# Patient Record
Sex: FEMALE | Race: BLACK OR AFRICAN AMERICAN | Employment: FULL TIME | URBAN - METROPOLITAN AREA
[De-identification: names, ages, dates, MRNs, and addresses within clinical notes are randomized per-mention and may not be internally consistent; named-entity substitution may affect disease eponyms.]

---
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
## 2018-01-19 PROBLEM — O36.5920 POOR FETAL GROWTH AFFECTING MANAGEMENT OF MOTHER IN SECOND TRIMESTER: Status: ACTIVE | Noted: 2017-12-13

## 2018-02-16 PROBLEM — O99.013: Status: ACTIVE | Noted: 2017-12-13

## 2018-02-16 PROBLEM — O26.893 HEADACHE IN PREGNANCY, THIRD TRIMESTER: Status: RESOLVED | Noted: 2017-12-13 | Resolved: 2018-02-16

## 2018-02-16 PROBLEM — R51.9 HEADACHE IN PREGNANCY, THIRD TRIMESTER: Status: RESOLVED | Noted: 2017-12-13 | Resolved: 2018-02-16

## 2018-02-16 PROBLEM — O36.5930 POOR FETAL GROWTH AFFECTING MANAGEMENT OF MOTHER IN THIRD TRIMESTER: Status: ACTIVE | Noted: 2017-12-13

## 2018-02-16 PROBLEM — O26.893 HEADACHE IN PREGNANCY, THIRD TRIMESTER: Status: ACTIVE | Noted: 2017-12-13

## 2018-03-16 PROBLEM — O09.93 HIGH-RISK PREGNANCY IN THIRD TRIMESTER: Status: ACTIVE | Noted: 2018-03-16

## 2020-02-20 PROBLEM — D57.3 SICKLE CELL TRAIT IN MOTHER AFFECTING PREGNANCY (HCC): Status: ACTIVE | Noted: 2017-12-13

## 2020-02-20 PROBLEM — O09.93 HIGH-RISK PREGNANCY IN THIRD TRIMESTER: Status: RESOLVED | Noted: 2018-03-16 | Resolved: 2020-02-20

## 2020-02-20 PROBLEM — O36.5930 POOR FETAL GROWTH AFFECTING MANAGEMENT OF MOTHER IN THIRD TRIMESTER: Status: RESOLVED | Noted: 2017-12-13 | Resolved: 2020-02-20

## 2020-02-20 PROBLEM — M41.9 SCOLIOSIS: Status: RESOLVED | Noted: 2017-12-13 | Resolved: 2020-02-20

## 2020-02-20 PROBLEM — O09.299 IUGR (INTRAUTERINE GROWTH RESTRICTION) IN PRIOR PREGNANCY, PREGNANT: Status: ACTIVE | Noted: 2020-02-20

## 2022-03-19 PROBLEM — O09.299 IUGR (INTRAUTERINE GROWTH RESTRICTION) IN PRIOR PREGNANCY, PREGNANT: Status: ACTIVE | Noted: 2020-02-20

## 2022-03-19 PROBLEM — O35.9XX0 SUSPECTED FETAL ANOMALY, ANTEPARTUM: Status: ACTIVE | Noted: 2017-12-13

## 2022-03-20 PROBLEM — O99.019 SICKLE CELL TRAIT IN MOTHER AFFECTING PREGNANCY (HCC): Status: ACTIVE | Noted: 2017-12-13

## 2022-03-20 PROBLEM — D57.3 SICKLE CELL TRAIT IN MOTHER AFFECTING PREGNANCY (HCC): Status: ACTIVE | Noted: 2017-12-13

## 2023-01-20 ENCOUNTER — OFFICE VISIT (OUTPATIENT)
Dept: OCCUPATIONAL MEDICINE | Age: 23
End: 2023-01-20

## 2023-01-20 VITALS — HEART RATE: 81 BPM | OXYGEN SATURATION: 97 % | DIASTOLIC BLOOD PRESSURE: 90 MMHG | SYSTOLIC BLOOD PRESSURE: 122 MMHG

## 2023-01-20 DIAGNOSIS — H57.12 LEFT EYE PAIN: Primary | ICD-10-CM

## 2023-01-20 DIAGNOSIS — H53.8 BLURRED VISION, LEFT EYE: ICD-10-CM

## 2023-01-20 RX ORDER — BUTALBITAL, ACETAMINOPHEN AND CAFFEINE 50; 325; 40 MG/1; MG/1; MG/1
1-2 TABLET ORAL EVERY 6 HOURS PRN
COMMUNITY
Start: 2022-03-08 | End: 2023-03-08

## 2023-01-20 ASSESSMENT — ENCOUNTER SYMPTOMS
EYE REDNESS: 1
EYE PAIN: 1
PHOTOPHOBIA: 1
EYE DISCHARGE: 0

## 2023-01-20 NOTE — PROGRESS NOTES
PROGRESS NOTE    SUBJECTIVE:   Michaela Gomez is a 23 y.o. female seen for left eye irritation. Chief Complaint    Eye Problem         Ms. Linda Orellana is a 24 y/o female with PMH migraines who relays she started having left eye erythema and irritation yesterday. She works in a [address] setting, but she denies feeling anything hit her left eye. She endorses tearing and eye pain, specifically when she is in sunlight. She also endorses an intermittent pressure that she feels \"all around her eye\". Endorses intermittent blurred vision and a frontal headache that started yesterday. She does not wear contacts or glasses. She relays it does not feel like a typical migraine. She has not taken any medication or tried any drops. Denies fevers, eye drainage/discharge. Current Outpatient Medications   Medication Sig Dispense Refill    butalbital-acetaminophen-caffeine (FIORICET, ESGIC) -40 MG per tablet Take 1-2 tablets by mouth every 6 hours as needed       No current facility-administered medications for this visit. No Known Allergies    Social History     Tobacco Use    Smoking status: Never    Smokeless tobacco: Never   Substance Use Topics    Alcohol use: Not Currently    Drug use: Not Currently        Review of Systems   Constitutional:  Negative for fever. Eyes:  Positive for photophobia, pain, redness and visual disturbance. Negative for discharge. Neurological:  Positive for headaches. Frontal headache yesterday. OBJECTIVE:  BP (!) 122/90 (Site: Left Upper Arm)   Pulse 81   SpO2 97%      No results found for this visit on 01/20/23. Physical Exam  Constitutional:       Appearance: Normal appearance. Eyes:      General:         Left eye: No foreign body or discharge. Conjunctiva/sclera:      Left eye: Left conjunctiva is injected. Pupils: Pupils are equal, round, and reactive to light. Comments: No foreign body or scratch seen on eye surface with handheld light.  Unable to utilize Derek's in clinic to assess for corneal abrasion. The left sclera and conjunctiva are injected. No discharge noted. L eye 20/40  R eye 20/25  Both: 20/25    I used several drops of sterile eyewash- patient denied feeling anything move/scratch. Denied pain with drops. Did not see improvement. Cardiovascular:      Rate and Rhythm: Normal rate and regular rhythm. Pulmonary:      Effort: Pulmonary effort is normal.   Neurological:      Mental Status: She is alert. ASSESSMENT and PLAN    Fernanda Baum was seen today for eye problem. Diagnoses and all orders for this visit:    Left eye pain  Comments:  Ophthalmology appt at 2:10 today with [facility] in [facility]. Blurred vision, left eye  Comments:  Ophthalmology appt at 2:10 today with [facility] in [facility]. Unable to see any visible scratches on eye- referring to Ophthalmology today at 2:10 pm with Dr. Dakotah Fletcher ([facility]). Patient was given work excuse for today, provided office address and phone number for her appt. She verbalized understanding. Counseled on benefits of having a primary care provider which includes, but is not limited to, continuity of care and having a medical home when concerns arise. Also enforced that onsite clinic policy states that we are not to take the place of a primary care provider, pt verbalized understanding. SEs and risk vs benefits associated with medications prescribed discussed with patient who verbalized understanding. Pt verbalized understanding and agreement with plan of care. RTC for persisting/worsening symptoms or new complaints that arise. Discussed signs and symptoms that would warrant immediate evaluation including, but not limited to HA, blurred vision, speech disturbance, difficulty with ambulation/gait, numbness, tingling, weakness, syncope, chest pain, or shortness of breath.     I have reviewed the patient's medication list, past medical, family, social, and surgical history in detail and updated the patient record appropriately.     Ayden Saenz, APRN - CNP

## 2023-01-23 ENCOUNTER — TELEPHONE (OUTPATIENT)
Dept: OCCUPATIONAL MEDICINE | Age: 23
End: 2023-01-23

## 2023-01-23 NOTE — TELEPHONE ENCOUNTER
Called patient to check in- she stated she had to reschedule her eye appt because she would not make the time scheduled. She has an eye appt on 1/26 of this week. She relays her eye is feeling better- the redness has decreased but she is still feeling pressure. She also endorses frontal headaches. She was informed to go to Urgent Care or the ER if she starts experiencing any of the following but not limited to: increased blurred vision, increased/painful eye pressure, N/V. She was also asked to RTC on 1/27 to discuss her eye appt. She verbalized understanding.

## 2023-02-10 ENCOUNTER — OFFICE VISIT (OUTPATIENT)
Dept: OCCUPATIONAL MEDICINE | Age: 23
End: 2023-02-10

## 2023-02-10 VITALS — HEART RATE: 100 BPM | DIASTOLIC BLOOD PRESSURE: 92 MMHG | OXYGEN SATURATION: 98 % | SYSTOLIC BLOOD PRESSURE: 124 MMHG

## 2023-02-10 DIAGNOSIS — R51.9 ACUTE NONINTRACTABLE HEADACHE, UNSPECIFIED HEADACHE TYPE: ICD-10-CM

## 2023-02-10 DIAGNOSIS — J30.9 ALLERGIC RHINITIS, UNSPECIFIED SEASONALITY, UNSPECIFIED TRIGGER: ICD-10-CM

## 2023-02-10 DIAGNOSIS — R52 BODY ACHES: ICD-10-CM

## 2023-02-10 DIAGNOSIS — H10.9 BACTERIAL CONJUNCTIVITIS OF RIGHT EYE: ICD-10-CM

## 2023-02-10 DIAGNOSIS — J06.9 VIRAL URI WITH COUGH: Primary | ICD-10-CM

## 2023-02-10 LAB
INFLUENZA A ANTIGEN, POC: NEGATIVE
INFLUENZA B ANTIGEN, POC: NEGATIVE
SARS-COV-2 RNA, POC: NEGATIVE

## 2023-02-10 RX ORDER — OFLOXACIN 3 MG/ML
2 SOLUTION/ DROPS OPHTHALMIC 4 TIMES DAILY
Qty: 5 ML | Refills: 0 | Status: SHIPPED | OUTPATIENT
Start: 2023-02-10 | End: 2023-02-17

## 2023-02-10 RX ORDER — LORATADINE 10 MG/1
10 TABLET ORAL DAILY
Qty: 30 TABLET | Refills: 1 | Status: SHIPPED | OUTPATIENT
Start: 2023-02-10

## 2023-02-10 ASSESSMENT — ENCOUNTER SYMPTOMS
WHEEZING: 0
ABDOMINAL PAIN: 0
NAUSEA: 0
EYE REDNESS: 1
DIARRHEA: 0
COUGH: 1
EYE DISCHARGE: 1
RHINORRHEA: 1
EYE PAIN: 1
SORE THROAT: 1
SHORTNESS OF BREATH: 0
VOMITING: 0
PHOTOPHOBIA: 0
CHEST TIGHTNESS: 0

## 2023-02-10 NOTE — PROGRESS NOTES
PROGRESS NOTE    SUBJECTIVE:   Bipin Paris is a 23 y.o. female seen for COVID/flu symptoms. Chief Complaint    Headache         Ms. Hortencia Ordonez is a 22 y/o female with PMH asthma who relays she has been experiencing frontal headaches, body aches, sore throat, cough, right eye pain with discharge x6-7 days. She is not aware of any sick contacts. She relays she woke up with \"my right eye crusted shut this morning\". She does endorse some mild pain with the right eye. Denies fever, SOB, difficulty breathing, chest pain, or visual differences. She does not use a rescue inhaler. She has been taking BC powders and Tylenol intermittently this past week. Headache    Current Outpatient Medications   Medication Sig Dispense Refill    ofloxacin (OCUFLOX) 0.3 % solution Place 2 drops into the right eye 4 times daily for 7 days 5 mL 0    loratadine (CLARITIN) 10 MG tablet Take 1 tablet by mouth daily 30 tablet 1    butalbital-acetaminophen-caffeine (FIORICET, ESGIC) -40 MG per tablet Take 1-2 tablets by mouth every 6 hours as needed       No current facility-administered medications for this visit. No Known Allergies    Social History     Tobacco Use    Smoking status: Never    Smokeless tobacco: Never   Substance Use Topics    Alcohol use: Not Currently    Drug use: Not Currently        Review of Systems   Constitutional:  Positive for fatigue. Negative for fever. HENT:  Positive for congestion, postnasal drip, rhinorrhea, sneezing and sore throat. Negative for ear pain. Eyes:  Positive for pain, discharge and redness. Negative for photophobia and visual disturbance. Right eye   Respiratory:  Positive for cough. Negative for chest tightness, shortness of breath and wheezing. Cardiovascular:  Negative for chest pain. Gastrointestinal:  Negative for abdominal pain, diarrhea, nausea and vomiting. Musculoskeletal:  Positive for myalgias. Neurological:  Positive for headaches. OBJECTIVE:  BP (!) 124/92 (Site: Left Upper Arm)   Pulse 100   SpO2 98%      Results for orders placed or performed in visit on 02/10/23   AMB POC SARS-COV-2 AND INFLUENZA A/B   Result Value Ref Range    SARS-COV-2 RNA, POC Negative     Influenza A Antigen, POC Negative Negative    Influenza B Antigen, POC Negative Negative       Physical Exam  Constitutional:       Appearance: She is ill-appearing. HENT:      Nose: Congestion present. Mouth/Throat:      Pharynx: Posterior oropharyngeal erythema present. Tonsils: No tonsillar exudate. 0 on the right. 0 on the left. Eyes:      Conjunctiva/sclera:      Right eye: Right conjunctiva is injected. Left eye: Left conjunctiva is not injected. Comments: Right lower lid edematous. No active discharge noted. Cardiovascular:      Rate and Rhythm: Normal rate and regular rhythm. Heart sounds: Normal heart sounds. Pulmonary:      Effort: Pulmonary effort is normal.      Breath sounds: Normal breath sounds. Neurological:      Mental Status: She is alert. ASSESSMENT and PLAN    Jagdish Jang was seen today for headache. Diagnoses and all orders for this visit:    Viral URI with cough  Comments:  COVID/flu negative. OTC measures given. Bacterial conjunctivitis of right eye  Comments:  Ofloxacin gtts. Work note today. Change bed/pillow linen. Wash hands if touching eye. Orders:  -     ofloxacin (OCUFLOX) 0.3 % solution; Place 2 drops into the right eye 4 times daily for 7 days    Allergic rhinitis, unspecified seasonality, unspecified trigger  Comments:  Start Claritin. Orders:  -     loratadine (CLARITIN) 10 MG tablet; Take 1 tablet by mouth daily    Acute nonintractable headache, unspecified headache type  -     AMB POC SARS-COV-2 AND INFLUENZA A/B    Body aches  -     AMB POC SARS-COV-2 AND INFLUENZA A/B    We discussed she could have another viral source.  OTC measures discussed for sore throat, headaches/body aches, cough, hydrate well with water. She was advised to not use BC powders- we discussed side effects from these. She was encouraged to use Fioricet PRN headaches and can use Ibuprofen 600 mg every 6-8 hours PRN body aches. She verbalized understanding. We also discussed bacterial conjunctivitis and treatment- she is aware she is contagious until 24 hours of abx use. We discussed how to use and duration. She can return to work tomorrow. Encouraged to change bed/pillow linen, wash hands if touching face. She verbalized understanding. We spoke about Claritin and possible signs of allergic rhinitis. She was encouraged to start this medication and may notice a difference in 2-3 weeks. She will contact her PCP if she begins to experience any wheezing if she needs an inhaler. She denies any breathing difficulties within this past week but knows to go to ER/urgent care if needed. Counseled on benefits of having a primary care provider which includes, but is not limited to, continuity of care and having a medical home when concerns arise. Also enforced that onsite clinic policy states that we are not to take the place of a primary care provider, pt verbalized understanding. SEs and risk vs benefits associated with medications prescribed discussed with patient who verbalized understanding. Pt verbalized understanding and agreement with plan of care. RTC for persisting/worsening symptoms or new complaints that arise. Discussed signs and symptoms that would warrant immediate evaluation including, but not limited to HA, blurred vision, speech disturbance, difficulty with ambulation/gait, numbness, tingling, weakness, syncope, chest pain, or shortness of breath. I have reviewed the patient's medication list, past medical, family, social, and surgical history in detail and updated the patient record appropriately.     Rocio Aggarwal, APRN - CNP